# Patient Record
Sex: MALE | Race: OTHER | HISPANIC OR LATINO | Employment: UNEMPLOYED | URBAN - METROPOLITAN AREA
[De-identification: names, ages, dates, MRNs, and addresses within clinical notes are randomized per-mention and may not be internally consistent; named-entity substitution may affect disease eponyms.]

---

## 2019-03-09 ENCOUNTER — HOSPITAL ENCOUNTER (EMERGENCY)
Age: 34
Discharge: HOME | End: 2019-03-09
Attending: EMERGENCY MEDICINE | Admitting: EMERGENCY MEDICINE
Payer: SELF-PAY

## 2019-03-09 VITALS
DIASTOLIC BLOOD PRESSURE: 90 MMHG | OXYGEN SATURATION: 97 % | RESPIRATION RATE: 17 BRPM | TEMPERATURE: 98.5 F | SYSTOLIC BLOOD PRESSURE: 128 MMHG | HEART RATE: 80 BPM

## 2019-03-09 DIAGNOSIS — G51.0 BELL'S PALSY: Primary | ICD-10-CM

## 2019-03-09 PROCEDURE — 99283 EMERGENCY DEPT VISIT LOW MDM: CPT

## 2019-03-09 RX ORDER — ACYCLOVIR 800 MG/1
800 TABLET ORAL
Qty: 35 TAB | Refills: 0 | Status: SHIPPED | OUTPATIENT
Start: 2019-03-09 | End: 2019-03-16

## 2019-03-09 RX ORDER — PREDNISONE 20 MG/1
40 TABLET ORAL
Qty: 10 TAB | Refills: 0 | Status: SHIPPED | OUTPATIENT
Start: 2019-03-09 | End: 2019-03-14

## 2019-03-09 NOTE — ED NOTES
Patient arrives for left sided facial droop and weakness to the left eye that has be ongoing for 12 days. Patient was seen at [facility] and was advised to come to ER. Denies any other weakness, numbness, tingling or any other deficits.

## 2019-03-09 NOTE — DISCHARGE INSTRUCTIONS
Patient Education        Parálisis facial de Perea: Instrucciones de cuidado - [ Bell's Palsy: Care Instructions ]  Instrucciones de cuidado    La parálisis facial de Perea es alex parálisis o debilitamiento de los músculos de un lado de la edinson. Las personas con parálisis facial de Perea suelen tener caído un lado de la boca y les carmen trabajo cerrar por completo el zeinab de carley mismo lado. La parálisis facial de Perea puede interferir también con el sentido del gusto. [place] sucede cuando se inflama un nervio de la edinson. La causa de la parálisis facial de Perea no es un ataque cerebral. No se conoce la causa de esta inflamación del nervio. Nas algunos expertos piensan que la causa podría ser un virus. Debido a esto, en ocasiones los médicos recetan un medicamento antiviral para tratarla. También podrían darle medicamentos para reducir la hinchazón. La parálisis facial de Perea por lo general mejora por sí diego en algunas semanas o meses. La atención de seguimiento es alex parte clave de suh tratamiento y seguridad. Asegúrese de hacer y acudir a todas las citas, y llame a suh médico si está teniendo problemas. También es alex buena idea saber los resultados de kye exámenes y mantener alex lista de los medicamentos que she. ¿Cómo puede cuidarse en el hogar? · [employer] medicamentos exactamente francisco le fueron recetados. Llame a suh médico si shannon estar teniendo problemas con suh medicamento. Recibirá [employer] medicamentos específicos recetados por suh médico.  · Use lágrimas artificiales o pomada si se le secan demasiado los ojos. La parálisis facial de Perea puede causar la caída del párpado inferior, lo que produce sequedad en el zeinab. · Si no puede cerrar el zeinab por completo, piense en usar un parche para dormir. · Ayúdese a parpadear usando un dedo para cerrar y abrir el párpado. [place] podría ayudar a mantener el zeinab húmedo. · Use anteojos o gafas para prevenir que el polvo y la ibis entren en el zeinab.   · A medida que recupera la sensación en la edinson, masajee la frente, las mejillas y los labios. El masaje podría fortalecer los músculos de la edinson. · Cepíllese los dientes y use hilo dental con frecuencia para ayudar a prevenir las caries. La parálisis facial de Perea puede secar la saliva en un lado de ush boca. [place] aumenta el riesgo de caries. ¿Cuándo debe pedir ayuda? Llame al 911 en cualquier momento que considere que necesita atención de [place]. Por ejemplo, llame si:    · Tiene síntomas de un ataque cerebral. Estos pueden incluir:  ? Entumecimiento, hormigueo, debilidad o parálisis repentinos en la edinson, el brazo o la pierna, sobre todo si ocurre en un solo lado del cuerpo. ? Cambios súbitos en la vista. ? Problemas repentinos para hablar. ? Confusión súbita o dificultad repentina para comprender frases sencillas. ? Problemas repentinos para caminar o mantener el equilibrio. ? Un dolor de wilner intenso y repentino, distinto a los dora de wilner anteriores.    Llame a suh médico ahora mismo o busque atención médica inmediata si:    · Siente entumecimiento o debilidad que se expande más allá de un lado de la edinson.     · Tiene un salpullido en la piel o dolor o enrojecimiento en el zeinab, o le molesta la arnold.     · Tiene nuevo dolor de wilner o devon empeora.    Preste especial atención a los cambios en suh teodoro y asegúrese de comunicarse con suh médico si:    · No mejora francisco se esperaba. ¿Dónde puede encontrar más información en inglés? Curtis odom http://shaina.info/. Will Dunn P168 en la búsqueda para aprender más acerca de \"Parálisis facial de Perea: Instrucciones de cuidado - [ Bell's Palsy: Care Instructions ]. \"  Revisado: 3 pattie, 2018  Versión del contenido: 11.9  © 5526-4125 [employer]. Las instrucciones de cuidado fueron adaptadas bajo licencia por [employer] (which disclaims liability or warranty for this information).  Si usted tiene Jian's alex afección médica o sobre estas instrucciones, siempre pregunte a suh profesional de teodoro. [facility] niega toda garantía o responsabilidad por suh uso de esta información.

## 2019-03-09 NOTE — ED PROVIDER NOTES
35 y.o. male with no significant past medical history who presents from [facility] via private vehicle with chief complaint of facial droop. Patient reports 12 days of a left sided facial droop. He reports being seen this morning at the [place] [address] and was referred here. Patient denies any extremity weakness or numbness. There are no other acute medical concerns at this time. Social hx: Former smoker; No EtOH use    Note written by Carlton Mosqueda, as dictated by Praful Olson MD 12:19 PM      The history is provided by the patient and a friend. The history is limited by a language barrier. A  was used (Friend at bedside). History reviewed. No pertinent past medical history. History reviewed. No pertinent surgical history. History reviewed. No pertinent family history. Social History     Socioeconomic History    Marital status: Not on file     Spouse name: Not on file    Number of children: Not on file    Years of education: Not on file    Highest education level: Not on file   Social Needs    Financial resource strain: Not on file    Food insecurity - worry: Not on file    Food insecurity - inability: Not on file    Transportation needs - medical: Not on file   [employer] needs - non-medical: Not on file   Occupational History    Not on file   Tobacco Use    Smoking status: Former Smoker    Smokeless tobacco: Never Used   Substance and Sexual Activity    Alcohol use: No     Frequency: Never    Drug use: No    Sexual activity: Not on file   Other Topics Concern    Not on file   Social History Narrative    Not on file         ALLERGIES: Patient has no known allergies. Review of Systems   Constitutional: Negative for activity change and fever. Eyes: Negative for pain. Respiratory: Negative for cough and shortness of breath. Cardiovascular: Negative for chest pain and leg swelling. Gastrointestinal: Negative for abdominal pain. Genitourinary: Negative for flank pain and hematuria. Musculoskeletal: Negative for gait problem, neck pain and neck stiffness. Skin: Negative for color change. Neurological: Positive for facial asymmetry. Negative for weakness, numbness and headaches. Hematological: Does not bruise/bleed easily. Psychiatric/Behavioral: Negative for confusion. All other systems reviewed and are negative. Vitals:    03/09/19 1208 03/09/19 1217   BP:  (!) 150/107   Pulse: 90 80   Resp:  17   Temp:  98.5 °F (36.9 °C)   SpO2: 98% 98%            Physical Exam   Constitutional: He is oriented to person, place, and time. He appears well-developed and well-nourished. No distress. HENT:   Head: Normocephalic and atraumatic. Right Ear: External ear normal.   Left Ear: External ear normal.   Eyes: EOM are normal. Pupils are equal, round, and reactive to light. Neck: Normal range of motion. Neck supple. No JVD present. No tracheal deviation present. Cardiovascular: Normal rate, regular rhythm and normal heart sounds. Exam reveals no gallop and no friction rub. No murmur heard. Pulmonary/Chest: Effort normal and breath sounds normal. No stridor. No respiratory distress. He has no wheezes. He has no rales. Abdominal: Soft. Bowel sounds are normal. He exhibits no distension. There is no tenderness. There is no rebound and no guarding. Musculoskeletal: Normal range of motion. He exhibits no edema or tenderness. Neurological: He is alert and oriented to person, place, and time. He has normal strength and normal reflexes. A cranial nerve deficit is present. Coordination normal.   Left sided facial droop. Left eyelid weakness. No strength to left forehead. Equal strength and sensation in bilateral upper and lower extremities. Skin: Skin is warm and dry. No rash noted. He is not diaphoretic. No erythema. Psychiatric: He has a normal mood and affect.  His behavior is normal. Judgment and thought content normal. Nursing note and vitals reviewed. Note written by Carlton Viramontes, as dictated by Jp Ortega MD 12:19 PM    MDM  Number of Diagnoses or Management Options  Bell's palsy:   Diagnosis management comments: 77-year-old  male presents with weakness on the left side of his face. Clinically patient has Bell's palsy. We'll treat with acyclovir, prednisone. We'll give followup with neurology as needed. ACP for followup as well. We'll also give him I went in to use to prevent dryness. Amount and/or Complexity of Data Reviewed  Decide to obtain previous medical records or to obtain history from someone other than the patient: yes  Review and summarize past medical records: yes  Independent visualization of images, tracings, or specimens: yes           Procedures    Pt has classic Bell's palsy. Will treat w/ Acyclovir and Prednisone w/ artificial tears    Gave PCP and neurology for f/u    Good return precautions given to patient. Close follow up with PCP recommended. Patient and/or family voices understanding of this plan. Discharge instructions were explained by me and all concerns were addressed.

## 2019-03-18 ENCOUNTER — HOSPITAL ENCOUNTER (EMERGENCY)
Age: 34
Discharge: HOME | End: 2019-03-19
Attending: EMERGENCY MEDICINE | Admitting: EMERGENCY MEDICINE
Payer: SELF-PAY

## 2019-03-18 DIAGNOSIS — G51.0 BELL'S PALSY: Primary | ICD-10-CM

## 2019-03-18 PROCEDURE — 99283 EMERGENCY DEPT VISIT LOW MDM: CPT

## 2019-03-19 VITALS
HEART RATE: 87 BPM | RESPIRATION RATE: 16 BRPM | SYSTOLIC BLOOD PRESSURE: 154 MMHG | DIASTOLIC BLOOD PRESSURE: 82 MMHG | TEMPERATURE: 98.4 F | OXYGEN SATURATION: 99 %

## 2019-03-19 NOTE — DISCHARGE INSTRUCTIONS
Patient Education      NO FURTHER MEDICATIONS ARE NEEDED. CONTINUE THE MOISTURIZING EYE DROPS AS NEEDED. USE PROTECTIVE EYE WEAR WHEN WORKING. Parálisis facial de Perea: Instrucciones de cuidado - [ Bell's Palsy: Care Instructions ]  Instrucciones de cuidado    La parálisis facial de Perea es alex parálisis o debilitamiento de los músculos de un lado de la edinson. Las personas con parálisis facial de Perea suelen tener caído un lado de la boca y les carmen trabajo cerrar por completo el zeinab de carley mismo lado. La parálisis facial de Perea puede interferir también con el sentido del gusto. [place] sucede cuando se inflama un nervio de la edinson. La causa de la parálisis facial de Perea no es un ataque cerebral. No se conoce la causa de esta inflamación del nervio. Nas algunos expertos piensan que la causa podría ser un virus. Debido a esto, en ocasiones los médicos recetan un medicamento antiviral para tratarla. También podrían darle medicamentos para reducir la hinchazón. La parálisis facial de Perea por lo general mejora por sí diego en algunas semanas o meses. La atención de seguimiento es alex parte clave de suh tratamiento y seguridad. Asegúrese de hacer y acudir a todas las citas, y llame a suh médico si está teniendo problemas. También es alex buena idea saber los resultados de kye exámenes y mantener alex lista de los medicamentos que she. ¿Cómo puede cuidarse en el hogar? · [employer] medicamentos exactamente francisco le fueron recetados. Llame a suh médico si shannon estar teniendo problemas con suh medicamento. Recibirá [employer] medicamentos específicos recetados por suh médico.  · Use lágrimas artificiales o pomada si se le secan demasiado los ojos. La parálisis facial de Perea puede causar la caída del párpado inferior, lo que produce sequedad en el zeinab. · Si no puede cerrar el zeinab por completo, piense en usar un parche para dormir. · Ayúdese a parpadear usando un dedo para cerrar y abrir el párpado.  [place] podría ayudar a mantener el zeinab húmedo. · Use anteojos o gafas para prevenir que el polvo y la ibis entren en el zeinab. · A medida que recupera la sensación en la edinson, masajee la frente, las mejillas y los labios. El masaje podría fortalecer los músculos de la edinson. · Cepíllese los dientes y use hilo dental con frecuencia para ayudar a prevenir las caries. La parálisis facial de Perea puede secar la saliva en un lado de suh boca. [place] aumenta el riesgo de caries. ¿Cuándo debe pedir ayuda? Llame al 911 en cualquier momento que considere que necesita atención de [place]. Por ejemplo, llame si:    · Tiene síntomas de un ataque cerebral. Estos pueden incluir:  ? Entumecimiento, hormigueo, debilidad o parálisis repentinos en la edinson, el brazo o la pierna, sobre todo si ocurre en un solo lado del cuerpo. ? Cambios súbitos en la vista. ? Problemas repentinos para hablar. ? Confusión súbita o dificultad repentina para comprender frases sencillas. ? Problemas repentinos para caminar o mantener el equilibrio. ? Un dolor de wilner intenso y repentino, distinto a los dora de wilner anteriores.    Llame a suh médico ahora mismo o busque atención médica inmediata si:    · Siente entumecimiento o debilidad que se expande más allá de un lado de la edinson.     · Tiene un salpullido en la piel o dolor o enrojecimiento en el zeinab, o le molesta la arnold.     · Tiene nuevo dolor de wilner o devon empeora.    Preste especial atención a los cambios en suh teodoro y asegúrese de comunicarse con suh médico si:    · No mejora francisco se esperaba. ¿Dónde puede encontrar más información en inglés? [place] a http://whit-santosh.info/. [employer] P168 en la búsqueda para aprender más acerca de \"Parálisis facial de Perea: Instrucciones de cuidado - [ Bell's Palsy: Care Instructions ]. \"  Revisado: 3 pattie, 2018  Versión del contenido: 11.9  © 5073-6266 [employer].  Las instrucciones de cuidado fueron adaptadas bajo [place] por [employer] (which disclaims liability or warranty for this information). Si usted tiene [place] afección médica o sobre estas instrucciones, siempre pregunte a suh profesional de teodoro. [facility] niega toda garantía o responsabilidad por suh uso de esta información.

## 2019-03-19 NOTE — ED PROVIDER NOTES
HPI     35year old Indonesian only speaking male presents requesting refills of prednisone and acyclovir prescribed 3/9 for bells palsy. He feels like he  Is getting better so wanted more medicine to continue improvement. He states symptoms started 3/25. He has no other symptoms or complaints. He is using lubricating eye drops as well. He works as a  and wants to know if he can go back to work. Followed at [place] [address]. No past medical history on file. No past surgical history on file. No family history on file. Social History     Socioeconomic History    Marital status:      Spouse name: Not on file    Number of children: Not on file    Years of education: Not on file    Highest education level: Not on file   Social Needs    Financial resource strain: Not on file    Food insecurity - worry: Not on file    Food insecurity - inability: Not on file    Transportation needs - medical: Not on file   [employer] needs - non-medical: Not on file   Occupational History    Not on file   Tobacco Use    Smoking status: Former Smoker    Smokeless tobacco: Never Used   Substance and Sexual Activity    Alcohol use: No     Frequency: Never    Drug use: No    Sexual activity: Not on file   Other Topics Concern    Not on file   Social History Narrative    Not on file         ALLERGIES: Patient has no known allergies. Review of Systems   Constitutional: Negative for fever. HENT: Negative for congestion. Eyes: Negative for visual disturbance. Respiratory: Negative for cough and chest tightness. Cardiovascular: Negative for chest pain. Gastrointestinal: Negative for abdominal pain, nausea and vomiting. Endocrine: Negative for polyuria. Genitourinary: Negative for dysuria. Musculoskeletal: Negative for gait problem. Skin: Negative for rash. Neurological: Positive for facial asymmetry. Negative for speech difficulty and headaches.    Psychiatric/Behavioral: Negative for dysphoric mood. Vitals:    03/18/19 2008   SpO2: 99%            Physical Exam   Constitutional: He is oriented to person, place, and time. He appears well-developed and well-nourished. No distress. HENT:   Head: Normocephalic and atraumatic. Mouth/Throat: No oropharyngeal exudate. Eyes: Pupils are equal, round, and reactive to light. Right eye exhibits no discharge. Left eye exhibits no discharge. No scleral icterus. Neck: Normal range of motion. Neck supple. No JVD present. Cardiovascular: Normal rate, regular rhythm and normal heart sounds. No murmur heard. Pulmonary/Chest: Effort normal and breath sounds normal. No stridor. No respiratory distress. He has no wheezes. He has no rales. He exhibits no tenderness. Abdominal: Soft. Bowel sounds are normal. He exhibits no distension and no mass. There is no tenderness. There is no rebound and no guarding. Musculoskeletal: Normal range of motion. Neurological: He is oriented to person, place, and time. Left facial droop involving the forehead consistent with bells palsy   Skin: Skin is warm and dry. Capillary refill takes less than 2 seconds. No rash noted. Psychiatric: He has a normal mood and affect. His behavior is normal. Judgment and thought content normal.        MDM       Procedures      Discussed bells palsy treatment and prognosis with patient and he understands. Will use protective eye wear at work, will continue lubricating drops. WIll follow up with care a ruy.